# Patient Record
Sex: FEMALE | Race: WHITE | NOT HISPANIC OR LATINO | Employment: UNEMPLOYED | URBAN - METROPOLITAN AREA
[De-identification: names, ages, dates, MRNs, and addresses within clinical notes are randomized per-mention and may not be internally consistent; named-entity substitution may affect disease eponyms.]

---

## 2020-02-24 ENCOUNTER — TELEPHONE (OUTPATIENT)
Dept: NEUROLOGY | Facility: CLINIC | Age: 20
End: 2020-02-24

## 2020-02-24 NOTE — TELEPHONE ENCOUNTER
----- Message from Nikko Gomez sent at 2/24/2020 11:12 AM CST -----  Contact: Patient @ [phone]  Patient calling to r/s the 3-4th appt, kristy call

## 2020-03-11 ENCOUNTER — TELEPHONE (OUTPATIENT)
Dept: NEUROLOGY | Facility: CLINIC | Age: 20
End: 2020-03-11

## 2020-03-11 NOTE — TELEPHONE ENCOUNTER
Appt cancelled with Dr. Flores for muscular dystrophy, Dr. Flores recommending pt be referred to Dr. Asher. Reaching out to Cecilia Bartholomew for assistance with scheduling.

## 2020-03-11 NOTE — TELEPHONE ENCOUNTER
----- Message from Per Flores MD sent at 3/4/2020  2:07 PM CST -----  Another one that needs to be rescheduled. Referral is for muscular dystrophy. Perhaps she can see Dr. Asher at [facility] since she needs a neuromuscular neurologist.     Thanks,    [facility]